# Patient Record
Sex: MALE | Race: WHITE | Employment: FULL TIME | URBAN - METROPOLITAN AREA
[De-identification: names, ages, dates, MRNs, and addresses within clinical notes are randomized per-mention and may not be internally consistent; named-entity substitution may affect disease eponyms.]

---

## 2017-07-23 ENCOUNTER — HOSPITAL ENCOUNTER (EMERGENCY)
Facility: CLINIC | Age: 37
Discharge: HOME | End: 2017-07-23
Attending: EMERGENCY MEDICINE | Admitting: EMERGENCY MEDICINE
Payer: COMMERCIAL

## 2017-07-23 ENCOUNTER — APPOINTMENT (OUTPATIENT)
Dept: MRI IMAGING | Facility: CLINIC | Age: 37
End: 2017-07-23
Attending: EMERGENCY MEDICINE
Payer: COMMERCIAL

## 2017-07-23 VITALS
TEMPERATURE: 97 F | DIASTOLIC BLOOD PRESSURE: 100 MMHG | BODY MASS INDEX: 31.81 KG/M2 | HEIGHT: 73 IN | SYSTOLIC BLOOD PRESSURE: 149 MMHG | OXYGEN SATURATION: 95 % | RESPIRATION RATE: 16 BRPM | WEIGHT: 240 LBS

## 2017-07-23 DIAGNOSIS — M54.5 LOW BACK PAIN, UNSPECIFIED BACK PAIN LATERALITY, UNSPECIFIED CHRONICITY, WITH SCIATICA PRESENCE UNSPECIFIED: ICD-10-CM

## 2017-07-23 LAB
ALBUMIN UR-MCNC: NEGATIVE MG/DL
ANION GAP SERPL CALCULATED.3IONS-SCNC: 9 MMOL/L (ref 3–14)
APPEARANCE UR: CLEAR
BASOPHILS # BLD AUTO: 0 10E9/L (ref 0–0.2)
BASOPHILS NFR BLD AUTO: 0.4 %
BILIRUB UR QL STRIP: NEGATIVE
BUN SERPL-MCNC: 10 MG/DL (ref 7–30)
CALCIUM SERPL-MCNC: 9 MG/DL (ref 8.5–10.1)
CHLORIDE SERPL-SCNC: 106 MMOL/L (ref 94–109)
CO2 SERPL-SCNC: 26 MMOL/L (ref 20–32)
COLOR UR AUTO: YELLOW
CREAT SERPL-MCNC: 0.73 MG/DL (ref 0.66–1.25)
DIFFERENTIAL METHOD BLD: NORMAL
EOSINOPHIL # BLD AUTO: 0.1 10E9/L (ref 0–0.7)
EOSINOPHIL NFR BLD AUTO: 1.3 %
ERYTHROCYTE [DISTWIDTH] IN BLOOD BY AUTOMATED COUNT: 13.9 % (ref 10–15)
GFR SERPL CREATININE-BSD FRML MDRD: ABNORMAL ML/MIN/1.7M2
GLUCOSE SERPL-MCNC: 103 MG/DL (ref 70–99)
GLUCOSE UR STRIP-MCNC: NEGATIVE MG/DL
HCT VFR BLD AUTO: 43.1 % (ref 40–53)
HGB BLD-MCNC: 15.4 G/DL (ref 13.3–17.7)
HGB UR QL STRIP: NEGATIVE
IMM GRANULOCYTES # BLD: 0 10E9/L (ref 0–0.4)
IMM GRANULOCYTES NFR BLD: 0.2 %
KETONES UR STRIP-MCNC: NEGATIVE MG/DL
LEUKOCYTE ESTERASE UR QL STRIP: NEGATIVE
LYMPHOCYTES # BLD AUTO: 1.1 10E9/L (ref 0.8–5.3)
LYMPHOCYTES NFR BLD AUTO: 19.1 %
MCH RBC QN AUTO: 31.5 PG (ref 26.5–33)
MCHC RBC AUTO-ENTMCNC: 35.7 G/DL (ref 31.5–36.5)
MCV RBC AUTO: 88 FL (ref 78–100)
MONOCYTES # BLD AUTO: 0.6 10E9/L (ref 0–1.3)
MONOCYTES NFR BLD AUTO: 10.6 %
MUCOUS THREADS #/AREA URNS LPF: PRESENT /LPF
NEUTROPHILS # BLD AUTO: 3.8 10E9/L (ref 1.6–8.3)
NEUTROPHILS NFR BLD AUTO: 68.4 %
NITRATE UR QL: NEGATIVE
NRBC # BLD AUTO: 0 10*3/UL
NRBC BLD AUTO-RTO: 0 /100
PH UR STRIP: 6 PH (ref 5–7)
PLATELET # BLD AUTO: 173 10E9/L (ref 150–450)
POTASSIUM SERPL-SCNC: 3.7 MMOL/L (ref 3.4–5.3)
RBC # BLD AUTO: 4.89 10E12/L (ref 4.4–5.9)
RBC #/AREA URNS AUTO: <1 /HPF (ref 0–2)
SODIUM SERPL-SCNC: 141 MMOL/L (ref 133–144)
SP GR UR STRIP: 1.01 (ref 1–1.03)
URN SPEC COLLECT METH UR: ABNORMAL
UROBILINOGEN UR STRIP-MCNC: NORMAL MG/DL (ref 0–2)
WBC # BLD AUTO: 5.6 10E9/L (ref 4–11)
WBC #/AREA URNS AUTO: 1 /HPF (ref 0–2)

## 2017-07-23 PROCEDURE — 96375 TX/PRO/DX INJ NEW DRUG ADDON: CPT

## 2017-07-23 PROCEDURE — 72148 MRI LUMBAR SPINE W/O DYE: CPT

## 2017-07-23 PROCEDURE — 99285 EMERGENCY DEPT VISIT HI MDM: CPT | Mod: 25

## 2017-07-23 PROCEDURE — 96374 THER/PROPH/DIAG INJ IV PUSH: CPT

## 2017-07-23 PROCEDURE — 81001 URINALYSIS AUTO W/SCOPE: CPT | Performed by: EMERGENCY MEDICINE

## 2017-07-23 PROCEDURE — 85025 COMPLETE CBC W/AUTO DIFF WBC: CPT | Performed by: EMERGENCY MEDICINE

## 2017-07-23 PROCEDURE — 25000128 H RX IP 250 OP 636: Performed by: EMERGENCY MEDICINE

## 2017-07-23 PROCEDURE — 80048 BASIC METABOLIC PNL TOTAL CA: CPT | Performed by: EMERGENCY MEDICINE

## 2017-07-23 RX ORDER — ONDANSETRON 2 MG/ML
4 INJECTION INTRAMUSCULAR; INTRAVENOUS ONCE
Status: COMPLETED | OUTPATIENT
Start: 2017-07-23 | End: 2017-07-23

## 2017-07-23 RX ORDER — MORPHINE SULFATE 4 MG/ML
4 INJECTION, SOLUTION INTRAMUSCULAR; INTRAVENOUS ONCE
Status: COMPLETED | OUTPATIENT
Start: 2017-07-23 | End: 2017-07-23

## 2017-07-23 RX ADMIN — ONDANSETRON 4 MG: 2 SOLUTION INTRAMUSCULAR; INTRAVENOUS at 07:47

## 2017-07-23 RX ADMIN — MORPHINE SULFATE 4 MG: 4 INJECTION, SOLUTION INTRAMUSCULAR; INTRAVENOUS at 07:53

## 2017-07-23 ASSESSMENT — ENCOUNTER SYMPTOMS
BACK PAIN: 1
COUGH: 1
ARTHRALGIAS: 1
HEADACHES: 1
DYSURIA: 0
FEVER: 0
BLOOD IN STOOL: 0
DIFFICULTY URINATING: 0
HEMATURIA: 0
WEAKNESS: 0
CONSTIPATION: 0
NUMBNESS: 1
DIARRHEA: 0

## 2017-07-23 NOTE — ED AVS SNAPSHOT
Emergency Department    [address] [facility] [address]    Phone:  [phone]    Fax:  [phone]                                       Frederick Zuleta   MRN: [id number]    Department:   Emergency Department   Date of Visit:  7/23/2017           Patient Information     Date Of Birth          1980        Your diagnoses for this visit were:     Low back pain, unspecified back pain laterality, unspecified chronicity, with sciatica presence unspecified        You were seen by Perla Saeed MD.      Follow-up Information     Follow up with Caren Bruce PA-C In 1 week.    Specialty:  Physician Assistant    Contact information:    [facility]  [address]  [place] [address]  [phone]          Discharge Instructions         General Neck and Back Pain    Both neck and back pain are usually caused by injury to the muscles or ligaments of the spine. Sometimes the disks that separate each bone of the spine may cause pain by pressing on a nearby nerve. Back and neck pain may appear after a sudden twisting or bending force (such as in a car accident), or sometimes after a simple awkward movement. In either case, muscle spasm is often present and adds to the pain.  Acute neck and back pain usually gets better in 1 to 2 weeks. Pain related to disk disease, arthritis in the spinal joints or spinal stenosis (narrowing of the spinal canal) can become chronic and last for months or years.  Back and neck pain are common problems. Most people feel better in 1 or 2 weeks, and most of the rest in 1 to 2 months. Most people can remain active.  People experience and describe pain differently.    Pain can be sharp, stabbing, shooting, aching, cramping, or burning    Movement, standing, bending, lifting, sitting, or walking may worsen the pain    Pain can be localized to one spot or area, or it can be more generalized    Pain can spread or radiate upwards, downwards, to the  front, or go down your arms    Muscle spasm may occur.  Most of the time mechanical problems with the muscles or spine cause the pain. it is usually caused by an injury, whether known or not, to the muscles or ligaments. While illnesses can cause back pain, it is usually not caused by a serious illness. Pain is usually related to physical activity, whether sports, exercise, work, or normal activity. Sometimes it can occur without an identifiable cause. This can happen simply by stretching or moving wrong, without noting pain at the time. Other causes include:    Overexertion, lifting, pushing, pulling incorrectly or too aggressively.    Sudden twisting, bending or stretching from an accident (car or fall), or accidental movement.    Poor posture    Poor conditioning, lack of regular exercise    Spinal disc disease or arthritis    Stress    Pregnancy, or illness like appendicitis, bladder or kidney infection, pelvic infections   Home care    For neck pain: Use a comfortable pillow that supports the head and keeps the spine in a neutral position. The position of the head should not be tilted forward or backward.    When in bed, try to find a position of comfort. A firm mattress is best. Try lying flat on your back with pillows under your knees. You can also try lying on your side with your knees bent up towards your chest and a pillow between your knees.    At first, do not try to stretch out the sore spots. If there is a strain, it is not like the good soreness you get after exercising without an injury. In this case, stretching may make it worse.    Avoid prolonged sitting, long car rides or travel. This puts more stress on the lower back than standing or walking.    During the first 24 to 72 hours after an injury, apply an ice pack to the painful area for 20 minutes and then remove it for 20 minutes over a period of 60 to 90 minutes or several times a day.     You can alternate ice and heat therapies. Talk with  your healthcare provider about the best treatment for your back or neck pain. As a safety precaution, do not use a heating pad at bedtime. Sleeping with a heating pad can lead to skin burns or tissue damage.    Therapeutic massage can help relax the back and neck muscles without stretching them.    Be aware of safe lifting methods and do not lift anything over 15 pounds until all the pain is gone.  Medications  Talk to your healthcare provider before using medicine, especially if you have other medical problems or are taking other medicines.    You may use over-the-counter medicine to control pain, unless another pain medicine was prescribed. If you have chronic conditions like diabetes, liver or kidney disease, stomach ulcers,  gastrointestinal bleeding, or are taking blood thinner medicines.    Be careful if you are given pain medicines, narcotics, or medicine for muscle spasm. They can cause drowsiness, and can affect your coordination, reflexes, and judgment. Do not drive or operate heavy machinery.  Follow-up care  Follow up with your healthcare provider, or as advised. Physical therapy or further tests may be needed.  If X-rays were taken, you will be notified of any new findings that may affect your care.  Call [phone]  Seek emergency medical care if any of the following occur:    Trouble breathing    Confusion    Very drowsy or trouble awakening    Fainting or loss of consciousness    Rapid or very slow heart rate    Loss of bowel or bladder control  When to seek medical advice  Call your healthcare provider right away if any of these occur:    Pain becomes worse or spreads into your arms or legs    Weakness, numbness or pain in one or both arms or legs    Numbness in the groin area    Difficulty walking    Fever of 100.4 F (38 C) or higher, or as directed by your healthcare provider  Date Last Reviewed: 7/1/2016 2000-2017 [employer]. [address] [place] [address]. All rights  reserved. This information is not intended as a substitute for professional medical care. Always follow your healthcare professional's instructions.          24 Hour Appointment Hotline       To make an appointment at any [facility], call [phone] ([phone]). If you don't have a family doctor or clinic, we will help you find one. [facility] clinics are conveniently located to serve the needs of you and your family.             Review of your medicines      Our records show that you are taking the medicines listed below. If these are incorrect, please call your family doctor or clinic.        Dose / Directions Last dose taken    ciprofloxacin 500 MG tablet   Commonly known as:  CIPRO   Dose:  500 mg   Quantity:  6 tablet        Take 1 tablet (500 mg) by mouth 2 times daily   Refills:  0        OMEPRAZOLE PO   Dose:  20 mg        Take 20 mg by mouth daily as needed   Refills:  0        ondansetron 4 MG ODT tab   Commonly known as:  ZOFRAN-ODT   Dose:  4 mg        Take 4 mg by mouth every 6 hours as needed for nausea   Refills:  0        opium-belladonna 60-16.2 MG per suppository   Commonly known as:  B&O SUPPRETTES   Dose:  60 mg        Place 60 mg rectally every 4 hours as needed for moderate pain   Refills:  0        * OXYBUTYNIN CHLORIDE PO   Dose:  5 mg        Take 5 mg by mouth every 4 hours as needed for bladder spasms (/irritation from stent)   Refills:  0        * oxybutynin 5 MG tablet   Commonly known as:  DITROPAN   Dose:  5 mg   Quantity:  25 tablet        Take 1 tablet (5 mg) by mouth 3 times daily as needed for bladder spasms   Refills:  0        * oxyCODONE-acetaminophen 5-325 MG per tablet   Commonly known as:  PERCOCET   Dose:  2 tablet        Take 2 tablets by mouth every 4 hours as needed for moderate to severe pain   Refills:  0        * oxyCODONE-acetaminophen 5-325 MG per tablet   Commonly known as:  PERCOCET   Dose:  1-2 tablet   Quantity:  20 tablet        Take 1-2 tablets by  mouth every 4 hours as needed for moderate to severe pain   Refills:  0        tamsulosin 0.4 MG capsule   Commonly known as:  FLOMAX   Dose:  0.4 mg        Take 0.4 mg by mouth daily Bladder spasms   Refills:  0        * Notice:  This list has 4 medication(s) that are the same as other medications prescribed for you. Read the directions carefully, and ask your doctor or other care provider to review them with you.            Procedures and tests performed during your visit     Basic metabolic panel    CBC with platelets differential    Lumbar spine MRI w/o contrast    Pulse oximetry nursing    UA with Microscopic    Vital signs      Orders Needing Specimen Collection     None      Pending Results     Date and Time Order Name Status Description    7/23/2017 0719 Lumbar spine MRI w/o contrast Preliminary             Pending Culture Results     No orders found from 7/21/2017 to 7/24/2017.            Pending Results Instructions     If you had any lab results that were not finalized at the time of your Discharge, you can call the ED Lab Result RN at [phone]. You will be contacted by this team for any positive Lab results or changes in treatment. The nurses are available 7 days a week from 10A to 6:30P.  You can leave a message 24 hours per day and they will return your call.        Test Results From Your Hospital Stay        7/23/2017  7:42 AM      Component Results     Component Value Ref Range & Units Status    WBC 5.6 4.0 - 11.0 10e9/L Final    RBC Count 4.89 4.4 - 5.9 10e12/L Final    Hemoglobin 15.4 13.3 - 17.7 g/dL Final    Hematocrit 43.1 40.0 - 53.0 % Final    MCV 88 78 - 100 fl Final    MCH 31.5 26.5 - 33.0 pg Final    MCHC 35.7 31.5 - 36.5 g/dL Final    RDW 13.9 10.0 - 15.0 % Final    Platelet Count 173 150 - 450 10e9/L Final    Diff Method Automated Method  Final    % Neutrophils 68.4 % Final    % Lymphocytes 19.1 % Final    % Monocytes 10.6 % Final    % Eosinophils 1.3 % Final    % Basophils 0.4 %  Final    % Immature Granulocytes 0.2 % Final    Nucleated RBCs 0 0 /100 Final    Absolute Neutrophil 3.8 1.6 - 8.3 10e9/L Final    Absolute Lymphocytes 1.1 0.8 - 5.3 10e9/L Final    Absolute Monocytes 0.6 0.0 - 1.3 10e9/L Final    Absolute Eosinophils 0.1 0.0 - 0.7 10e9/L Final    Absolute Basophils 0.0 0.0 - 0.2 10e9/L Final    Abs Immature Granulocytes 0.0 0 - 0.4 10e9/L Final    Absolute Nucleated RBC 0.0  Final         7/23/2017  7:55 AM      Component Results     Component Value Ref Range & Units Status    Sodium 141 133 - 144 mmol/L Final    Potassium 3.7 3.4 - 5.3 mmol/L Final    Chloride 106 94 - 109 mmol/L Final    Carbon Dioxide 26 20 - 32 mmol/L Final    Anion Gap 9 3 - 14 mmol/L Final    Glucose 103 (H) 70 - 99 mg/dL Final    Urea Nitrogen 10 7 - 30 mg/dL Final    Creatinine 0.73 0.66 - 1.25 mg/dL Final    GFR Estimate >90  Non  GFR Calc   >60 mL/min/1.7m2 Final    GFR Estimate If Black >90   GFR Calc   >60 mL/min/1.7m2 Final    Calcium 9.0 8.5 - 10.1 mg/dL Final         7/23/2017  7:55 AM      Component Results     Component Value Ref Range & Units Status    Color Urine Yellow  Final    Appearance Urine Clear  Final    Glucose Urine Negative NEG mg/dL Final    Bilirubin Urine Negative NEG Final    Ketones Urine Negative NEG mg/dL Final    Specific Gravity Urine 1.015 1.003 - 1.035 Final    Blood Urine Negative NEG Final    pH Urine 6.0 5.0 - 7.0 pH Final    Protein Albumin Urine Negative NEG mg/dL Final    Urobilinogen mg/dL Normal 0.0 - 2.0 mg/dL Final    Nitrite Urine Negative NEG Final    Leukocyte Esterase Urine Negative NEG Final    Source Midstream Urine  Final    WBC Urine 1 0 - 2 /HPF Final    RBC Urine <1 0 - 2 /HPF Final    Mucous Urine Present (A) NEG /LPF Final         7/23/2017  9:05 AM      Narrative     MRI LUMBAR SPINE WITHOUT CONTRAST July 23, 2017 8:54 AM     HISTORY: Back pain with numbness going down left leg with recent  diagnosis of blastomycosis.  Symptoms for two to three weeks.    TECHNIQUE: Multiplanar multisequence MRI of the lumbar spine without  contrast.    COMPARISON: None.    FINDINGS: The report is dictated assuming five lumbar-type vertebral  bodies, and radiographic correlation may be necessary.  The distal  spinal cord and cauda equina appear normal.      T12-L1: Normal disc, facet joints, spinal canal and neural foramina.      L1-L2: Normal disc, facet joints, spinal canal and neural foramina.      L2-L3: Normal disc, facet joints, spinal canal and neural foramina.      L3-L4: Normal disc, facet joints, spinal canal and neural foramina.     L4-L5: Normal disc, facet joints, spinal canal and neural foramina.      L5-S1: Normal disc, facet joints, spinal canal and neural foramina.      Paraspinous soft tissues: Normal.      Bone marrow: Normal.         Impression     IMPRESSION: Normal MRI of the lumbar spine. No evidence of lumbar  spinal infection.                  Clinical Quality Measure: Blood Pressure Screening     Your blood pressure was checked while you were in the emergency department today. The last reading we obtained was  BP: (!) 134/92 . Please read the guidelines below about what these numbers mean and what you should do about them.  If your systolic blood pressure (the top number) is less than 120 and your diastolic blood pressure (the bottom number) is less than 80, then your blood pressure is normal. There is nothing more that you need to do about it.  If your systolic blood pressure (the top number) is 120-139 or your diastolic blood pressure (the bottom number) is 80-89, your blood pressure may be higher than it should be. You should have your blood pressure rechecked within a year by a primary care provider.  If your systolic blood pressure (the top number) is 140 or greater or your diastolic blood pressure (the bottom number) is 90 or greater, you may have high blood pressure. High blood pressure is treatable, but if left  "untreated over time it can put you at risk for heart attack, stroke, or kidney failure. You should have your blood pressure rechecked by a primary care provider within the next 4 weeks.  If your provider in the emergency department today gave you specific instructions to follow-up with your doctor or provider even sooner than that, you should follow that instruction and not wait for up to 4 weeks for your follow-up visit.        Thank you for choosing [facility]       Thank you for choosing [facility] for your care. Our goal is always to provide you with excellent care. Hearing back from our patients is one way we can continue to improve our services. Please take a few minutes to complete the written survey that you may receive in the mail after you visit with us. Thank you!        [employer] Information     [employer] lets you send messages to your doctor, view your test results, renew your prescriptions, schedule appointments and more. To sign up, go to [email or web address] . Click on \"Log in\" on the left side of the screen, which will take you to the Welcome page. Then click on \"Sign up Now\" on the right side of the page.     You will be asked to enter the access code listed below, as well as some personal information. Please follow the directions to create your username and password.     Your access code is: [email or web address]  Expires: 10/21/2017  9:17 AM     Your access code will  in 90 days. If you need help or a new code, please call your [facility] clinic or [phone].        Care EveryWhere ID     This is your Care EveryWhere ID. This could be used by other organizations to access your [facility] medical records  [id number]        Equal Access to Services     [facility]: Hadii sharon Gomez, watitada anselmo, qaybjairo modi. So [facility] [phone].    ATENCIÓN: Si habla español, tiene a robert disposición servicios gratuitos de asistencia lingüística. " Jim harris [phone].    We comply with applicable federal civil rights laws and [place] laws. We do not discriminate on the basis of race, color, national origin, age, disability sex, sexual orientation or gender identity.            After Visit Summary       This is your record. Keep this with you and show to your community pharmacist(s) and doctor(s) at your next visit.

## 2017-07-23 NOTE — DISCHARGE INSTRUCTIONS

## 2017-07-23 NOTE — ED AVS SNAPSHOT
Emergency Department    [address] [place] [address]    Phone:  [phone]    Fax:  [phone]                                       Frederick Zuleta   MRN: [id number]    Department:   Emergency Department   Date of Visit:  7/23/2017           After Visit Summary Signature Page     I have received my discharge instructions, and my questions have been answered. I have discussed any challenges I see with this plan with the nurse or doctor.    ..........................................................................................................................................  Patient/Patient Representative Signature      ..........................................................................................................................................  Patient Representative Print Name and Relationship to Patient    ..................................................               ................................................  Date                                            Time    ..........................................................................................................................................  Reviewed by Signature/Title    ...................................................              ..............................................  Date                                                            Time

## 2017-07-23 NOTE — LETTER
EMERGENCY DEPARTMENT  [address] [facility] [address]  [phone]    2017    Frederick Zuleta  [address] [facility] [address]  [phone] (home) [phone] (work)    : 1980      To Whom it may concern:    Frederick Zuleta was seen in our Emergency Department today, 2017. He may return to work tomorrow    Sincerely,        Perla Saeed
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
